# Patient Record
Sex: FEMALE | Race: WHITE | URBAN - METROPOLITAN AREA
[De-identification: names, ages, dates, MRNs, and addresses within clinical notes are randomized per-mention and may not be internally consistent; named-entity substitution may affect disease eponyms.]

---

## 2022-02-15 ENCOUNTER — OUTPATIENT (OUTPATIENT)
Dept: OUTPATIENT SERVICES | Facility: HOSPITAL | Age: 67
LOS: 1 days | Discharge: HOME | End: 2022-02-15
Payer: MEDICARE

## 2022-02-15 DIAGNOSIS — Z12.31 ENCOUNTER FOR SCREENING MAMMOGRAM FOR MALIGNANT NEOPLASM OF BREAST: ICD-10-CM

## 2022-02-15 PROCEDURE — 77067 SCR MAMMO BI INCL CAD: CPT | Mod: 26

## 2022-02-15 PROCEDURE — 77063 BREAST TOMOSYNTHESIS BI: CPT | Mod: 26

## 2022-09-05 ENCOUNTER — APPOINTMENT (OUTPATIENT)
Dept: ORTHOPEDIC SURGERY | Facility: CLINIC | Age: 67
End: 2022-09-05

## 2024-03-14 ENCOUNTER — APPOINTMENT (OUTPATIENT)
Dept: PULMONOLOGY | Facility: CLINIC | Age: 69
End: 2024-03-14

## 2024-04-16 ENCOUNTER — APPOINTMENT (OUTPATIENT)
Dept: PULMONOLOGY | Facility: CLINIC | Age: 69
End: 2024-04-16
Payer: MEDICARE

## 2024-04-16 VITALS
DIASTOLIC BLOOD PRESSURE: 80 MMHG | BODY MASS INDEX: 25.76 KG/M2 | WEIGHT: 140 LBS | HEIGHT: 62 IN | HEART RATE: 68 BPM | OXYGEN SATURATION: 97 % | SYSTOLIC BLOOD PRESSURE: 124 MMHG

## 2024-04-16 DIAGNOSIS — Z87.891 PERSONAL HISTORY OF NICOTINE DEPENDENCE: ICD-10-CM

## 2024-04-16 PROBLEM — Z00.00 ENCOUNTER FOR PREVENTIVE HEALTH EXAMINATION: Status: ACTIVE | Noted: 2024-04-16

## 2024-04-16 PROCEDURE — 99203 OFFICE O/P NEW LOW 30 MIN: CPT

## 2024-04-16 RX ORDER — METOPROLOL SUCCINATE 50 MG/1
50 TABLET, EXTENDED RELEASE ORAL
Refills: 0 | Status: ACTIVE | COMMUNITY

## 2024-04-16 RX ORDER — FLUTICASONE PROPIONATE AND SALMETEROL 50; 100 UG/1; UG/1
100-50 POWDER RESPIRATORY (INHALATION)
Refills: 0 | Status: ACTIVE | COMMUNITY

## 2024-04-16 RX ORDER — ALBUTEROL SULFATE 90 UG/1
108 (90 BASE) INHALANT RESPIRATORY (INHALATION)
Refills: 0 | Status: ACTIVE | COMMUNITY

## 2024-04-16 RX ORDER — FAMOTIDINE 20 MG/1
20 TABLET, FILM COATED ORAL
Refills: 0 | Status: ACTIVE | COMMUNITY

## 2024-04-16 NOTE — ASSESSMENT
[FreeTextEntry1] : Multiple lung nodules on CT  Largest is 1.1cm in the RLL  Airway sign noted  Will get PET CT  Recommend Robotic assisted bronchoscopy with bx  She will think about it  2 week follow up

## 2024-04-16 NOTE — HISTORY OF PRESENT ILLNESS
[TextBox_4] : 68 year old woman non smoker, no history of cancer, recently had a URI, CXR showing possible nodules, CT done and showing multiple nodules. Most concerning is the RLL nodule measuring about 1.1 cm with a bronchus sign.

## 2024-04-18 ENCOUNTER — OUTPATIENT (OUTPATIENT)
Dept: OUTPATIENT SERVICES | Facility: HOSPITAL | Age: 69
LOS: 1 days | End: 2024-04-18
Payer: MEDICARE

## 2024-04-18 DIAGNOSIS — R91.8 OTHER NONSPECIFIC ABNORMAL FINDING OF LUNG FIELD: ICD-10-CM

## 2024-04-18 LAB — GLUCOSE BLDC GLUCOMTR-MCNC: 83 MG/DL — SIGNIFICANT CHANGE UP (ref 70–99)

## 2024-04-18 PROCEDURE — 82962 GLUCOSE BLOOD TEST: CPT

## 2024-04-18 PROCEDURE — 78815 PET IMAGE W/CT SKULL-THIGH: CPT | Mod: PI

## 2024-04-18 PROCEDURE — A9552: CPT

## 2024-04-18 PROCEDURE — 78815 PET IMAGE W/CT SKULL-THIGH: CPT | Mod: 26,PI,MH

## 2024-04-19 DIAGNOSIS — R91.8 OTHER NONSPECIFIC ABNORMAL FINDING OF LUNG FIELD: ICD-10-CM

## 2024-04-30 ENCOUNTER — APPOINTMENT (OUTPATIENT)
Dept: PULMONOLOGY | Facility: CLINIC | Age: 69
End: 2024-04-30
Payer: MEDICARE

## 2024-04-30 VITALS
OXYGEN SATURATION: 99 % | DIASTOLIC BLOOD PRESSURE: 70 MMHG | BODY MASS INDEX: 25.76 KG/M2 | WEIGHT: 140 LBS | SYSTOLIC BLOOD PRESSURE: 110 MMHG | HEART RATE: 87 BPM | HEIGHT: 62 IN

## 2024-04-30 DIAGNOSIS — R91.8 OTHER NONSPECIFIC ABNORMAL FINDING OF LUNG FIELD: ICD-10-CM

## 2024-04-30 DIAGNOSIS — J45.991 COUGH VARIANT ASTHMA: ICD-10-CM

## 2024-04-30 PROCEDURE — 99214 OFFICE O/P EST MOD 30 MIN: CPT

## 2024-04-30 RX ORDER — ALBUTEROL SULFATE 90 UG/1
108 (90 BASE) INHALANT RESPIRATORY (INHALATION)
Qty: 3 | Refills: 1 | Status: ACTIVE | COMMUNITY
Start: 2024-04-30 | End: 1900-01-01

## 2024-04-30 NOTE — HISTORY OF PRESENT ILLNESS
[TextBox_4] : 68 year old woman non smoker, no history of cancer, recently had a URI, CXR showing possible nodules, CT done and showing multiple nodules. Most concerning is the RLL nodule measuring about 1.1 cm with a bronchus sign.   PET CT done; none are FDG avid.

## 2024-04-30 NOTE — ASSESSMENT
[FreeTextEntry1] : Multiple lung nodules on CT  Largest is 1.1cm in the RLL  Airway sign noted  PET CT negative  Recommend Robotic assisted bronchoscopy with bx  Patient consented to it  Lungs clear on exam  Occasional cough; likely post nasal drip  Also has HAAD on Advair and Albuterol  1 month follow up

## 2024-05-06 ENCOUNTER — TRANSCRIPTION ENCOUNTER (OUTPATIENT)
Age: 69
End: 2024-05-06

## 2024-05-06 RX ORDER — FLUTICASONE PROPIONATE AND SALMETEROL 100; 50 UG/1; UG/1
100-50 POWDER RESPIRATORY (INHALATION)
Qty: 3 | Refills: 1 | Status: ACTIVE | COMMUNITY
Start: 2024-04-30 | End: 1900-01-01

## 2024-05-07 RX ORDER — FLUTICASONE PROPIONATE AND SALMETEROL 250; 50 UG/1; UG/1
250-50 POWDER RESPIRATORY (INHALATION)
Qty: 1 | Refills: 3 | Status: ACTIVE | COMMUNITY
Start: 2024-05-07 | End: 1900-01-01

## 2024-05-08 ENCOUNTER — RESULT REVIEW (OUTPATIENT)
Age: 69
End: 2024-05-08

## 2024-05-08 NOTE — ASU PATIENT PROFILE, ADULT - PAIN SCALE PREFERRED, PROFILE
Please have your child follow-up with her PCP as scheduled for tomorrow.  Please return to the emergency department for any new or worsening symptoms as discussed.  
numerical 0-10

## 2024-05-09 ENCOUNTER — RESULT REVIEW (OUTPATIENT)
Age: 69
End: 2024-05-09

## 2024-05-09 ENCOUNTER — INPATIENT (INPATIENT)
Facility: HOSPITAL | Age: 69
LOS: 0 days | Discharge: HOME | DRG: 189 | End: 2024-05-10
Attending: INTERNAL MEDICINE | Admitting: INTERNAL MEDICINE
Payer: MEDICARE

## 2024-05-09 VITALS
DIASTOLIC BLOOD PRESSURE: 56 MMHG | WEIGHT: 139.99 LBS | TEMPERATURE: 98 F | OXYGEN SATURATION: 97 % | RESPIRATION RATE: 18 BRPM | SYSTOLIC BLOOD PRESSURE: 117 MMHG | HEART RATE: 90 BPM | HEIGHT: 62 IN

## 2024-05-09 DIAGNOSIS — R91.1 SOLITARY PULMONARY NODULE: ICD-10-CM

## 2024-05-09 DIAGNOSIS — J96.01 ACUTE RESPIRATORY FAILURE WITH HYPOXIA: ICD-10-CM

## 2024-05-09 DIAGNOSIS — I48.91 UNSPECIFIED ATRIAL FIBRILLATION: ICD-10-CM

## 2024-05-09 DIAGNOSIS — R04.89 HEMORRHAGE FROM OTHER SITES IN RESPIRATORY PASSAGES: ICD-10-CM

## 2024-05-09 DIAGNOSIS — Z98.51 TUBAL LIGATION STATUS: Chronic | ICD-10-CM

## 2024-05-09 LAB
BASOPHILS # BLD AUTO: 0.03 K/UL — SIGNIFICANT CHANGE UP (ref 0–0.2)
BASOPHILS NFR BLD AUTO: 0.2 % — SIGNIFICANT CHANGE UP (ref 0–1)
EOSINOPHIL # BLD AUTO: 0.01 K/UL — SIGNIFICANT CHANGE UP (ref 0–0.7)
EOSINOPHIL NFR BLD AUTO: 0.1 % — SIGNIFICANT CHANGE UP (ref 0–8)
GAS PNL BLDA: SIGNIFICANT CHANGE UP
HCT VFR BLD CALC: 37 % — SIGNIFICANT CHANGE UP (ref 37–47)
HGB BLD-MCNC: 11.9 G/DL — LOW (ref 12–16)
IMM GRANULOCYTES NFR BLD AUTO: 0.8 % — HIGH (ref 0.1–0.3)
LYMPHOCYTES # BLD AUTO: 1.41 K/UL — SIGNIFICANT CHANGE UP (ref 1.2–3.4)
LYMPHOCYTES # BLD AUTO: 7.7 % — LOW (ref 20.5–51.1)
MCHC RBC-ENTMCNC: 28.9 PG — SIGNIFICANT CHANGE UP (ref 27–31)
MCHC RBC-ENTMCNC: 32.2 G/DL — SIGNIFICANT CHANGE UP (ref 32–37)
MCV RBC AUTO: 89.8 FL — SIGNIFICANT CHANGE UP (ref 81–99)
MONOCYTES # BLD AUTO: 0.56 K/UL — SIGNIFICANT CHANGE UP (ref 0.1–0.6)
MONOCYTES NFR BLD AUTO: 3.1 % — SIGNIFICANT CHANGE UP (ref 1.7–9.3)
NEUTROPHILS # BLD AUTO: 16.05 K/UL — HIGH (ref 1.4–6.5)
NEUTROPHILS NFR BLD AUTO: 88.1 % — HIGH (ref 42.2–75.2)
NRBC # BLD: 0 /100 WBCS — SIGNIFICANT CHANGE UP (ref 0–0)
PLATELET # BLD AUTO: 239 K/UL — SIGNIFICANT CHANGE UP (ref 130–400)
PMV BLD: 11.2 FL — HIGH (ref 7.4–10.4)
RBC # BLD: 4.12 M/UL — LOW (ref 4.2–5.4)
RBC # FLD: 13.4 % — SIGNIFICANT CHANGE UP (ref 11.5–14.5)
TROPONIN T, HIGH SENSITIVITY RESULT: 50 NG/L — HIGH (ref 6–13)
WBC # BLD: 18.21 K/UL — HIGH (ref 4.8–10.8)
WBC # FLD AUTO: 18.21 K/UL — HIGH (ref 4.8–10.8)

## 2024-05-09 PROCEDURE — 88173 CYTOPATH EVAL FNA REPORT: CPT | Mod: 26

## 2024-05-09 PROCEDURE — 86850 RBC ANTIBODY SCREEN: CPT

## 2024-05-09 PROCEDURE — 86900 BLOOD TYPING SEROLOGIC ABO: CPT

## 2024-05-09 PROCEDURE — 94640 AIRWAY INHALATION TREATMENT: CPT

## 2024-05-09 PROCEDURE — 84132 ASSAY OF SERUM POTASSIUM: CPT

## 2024-05-09 PROCEDURE — 36415 COLL VENOUS BLD VENIPUNCTURE: CPT

## 2024-05-09 PROCEDURE — 88360 TUMOR IMMUNOHISTOCHEM/MANUAL: CPT

## 2024-05-09 PROCEDURE — 85025 COMPLETE CBC W/AUTO DIFF WBC: CPT

## 2024-05-09 PROCEDURE — 80053 COMPREHEN METABOLIC PANEL: CPT

## 2024-05-09 PROCEDURE — 82330 ASSAY OF CALCIUM: CPT

## 2024-05-09 PROCEDURE — 85014 HEMATOCRIT: CPT

## 2024-05-09 PROCEDURE — 88342 IMHCHEM/IMCYTCHM 1ST ANTB: CPT

## 2024-05-09 PROCEDURE — 88341 IMHCHEM/IMCYTCHM EA ADD ANTB: CPT

## 2024-05-09 PROCEDURE — 88342 IMHCHEM/IMCYTCHM 1ST ANTB: CPT | Mod: 26

## 2024-05-09 PROCEDURE — 88312 SPECIAL STAINS GROUP 1: CPT | Mod: 26

## 2024-05-09 PROCEDURE — 88344 IMHCHEM/IMCYTCHM EA MLT ANTB: CPT

## 2024-05-09 PROCEDURE — 88341 IMHCHEM/IMCYTCHM EA ADD ANTB: CPT | Mod: 26

## 2024-05-09 PROCEDURE — 85018 HEMOGLOBIN: CPT

## 2024-05-09 PROCEDURE — 84484 ASSAY OF TROPONIN QUANT: CPT

## 2024-05-09 PROCEDURE — 88173 CYTOPATH EVAL FNA REPORT: CPT

## 2024-05-09 PROCEDURE — 83735 ASSAY OF MAGNESIUM: CPT

## 2024-05-09 PROCEDURE — 71045 X-RAY EXAM CHEST 1 VIEW: CPT

## 2024-05-09 PROCEDURE — 31627 NAVIGATIONAL BRONCHOSCOPY: CPT

## 2024-05-09 PROCEDURE — 88112 CYTOPATH CELL ENHANCE TECH: CPT | Mod: 26,59

## 2024-05-09 PROCEDURE — 93010 ELECTROCARDIOGRAM REPORT: CPT

## 2024-05-09 PROCEDURE — 84295 ASSAY OF SERUM SODIUM: CPT

## 2024-05-09 PROCEDURE — 93005 ELECTROCARDIOGRAM TRACING: CPT

## 2024-05-09 PROCEDURE — 31624 DX BRONCHOSCOPE/LAVAGE: CPT

## 2024-05-09 PROCEDURE — 71045 X-RAY EXAM CHEST 1 VIEW: CPT | Mod: 26

## 2024-05-09 PROCEDURE — 83605 ASSAY OF LACTIC ACID: CPT

## 2024-05-09 PROCEDURE — 82803 BLOOD GASES ANY COMBINATION: CPT

## 2024-05-09 PROCEDURE — C9399: CPT

## 2024-05-09 PROCEDURE — 88305 TISSUE EXAM BY PATHOLOGIST: CPT | Mod: 26

## 2024-05-09 PROCEDURE — 31628 BRONCHOSCOPY/LUNG BX EACH: CPT | Mod: XU

## 2024-05-09 PROCEDURE — 86901 BLOOD TYPING SEROLOGIC RH(D): CPT

## 2024-05-09 RX ORDER — METOPROLOL TARTRATE 50 MG
1 TABLET ORAL
Refills: 0 | DISCHARGE

## 2024-05-09 RX ORDER — HYDROMORPHONE HYDROCHLORIDE 2 MG/ML
0.5 INJECTION INTRAMUSCULAR; INTRAVENOUS; SUBCUTANEOUS
Refills: 0 | Status: DISCONTINUED | OUTPATIENT
Start: 2024-05-09 | End: 2024-05-10

## 2024-05-09 RX ORDER — LANOLIN ALCOHOL/MO/W.PET/CERES
3 CREAM (GRAM) TOPICAL AT BEDTIME
Refills: 0 | Status: DISCONTINUED | OUTPATIENT
Start: 2024-05-09 | End: 2024-05-10

## 2024-05-09 RX ORDER — ACETAMINOPHEN 500 MG
650 TABLET ORAL ONCE
Refills: 0 | Status: COMPLETED | OUTPATIENT
Start: 2024-05-09 | End: 2024-05-09

## 2024-05-09 RX ORDER — FENTANYL CITRATE 50 UG/ML
50 INJECTION INTRAVENOUS ONCE
Refills: 0 | Status: DISCONTINUED | OUTPATIENT
Start: 2024-05-09 | End: 2024-05-09

## 2024-05-09 RX ORDER — FENTANYL CITRATE 50 UG/ML
0.5 INJECTION INTRAVENOUS
Qty: 5000 | Refills: 0 | Status: DISCONTINUED | OUTPATIENT
Start: 2024-05-09 | End: 2024-05-09

## 2024-05-09 RX ORDER — IPRATROPIUM/ALBUTEROL SULFATE 18-103MCG
3 AEROSOL WITH ADAPTER (GRAM) INHALATION ONCE
Refills: 0 | Status: COMPLETED | OUTPATIENT
Start: 2024-05-09 | End: 2024-05-09

## 2024-05-09 RX ORDER — IPRATROPIUM/ALBUTEROL SULFATE 18-103MCG
3 AEROSOL WITH ADAPTER (GRAM) INHALATION EVERY 6 HOURS
Refills: 0 | Status: DISCONTINUED | OUTPATIENT
Start: 2024-05-09 | End: 2024-05-10

## 2024-05-09 RX ORDER — ALBUTEROL 90 UG/1
2 AEROSOL, METERED ORAL EVERY 4 HOURS
Refills: 0 | Status: DISCONTINUED | OUTPATIENT
Start: 2024-05-09 | End: 2024-05-10

## 2024-05-09 RX ORDER — MORPHINE SULFATE 50 MG/1
2 CAPSULE, EXTENDED RELEASE ORAL
Refills: 0 | Status: DISCONTINUED | OUTPATIENT
Start: 2024-05-09 | End: 2024-05-10

## 2024-05-09 RX ORDER — FENTANYL CITRATE 50 UG/ML
0.5 INJECTION INTRAVENOUS
Qty: 2500 | Refills: 0 | Status: DISCONTINUED | OUTPATIENT
Start: 2024-05-09 | End: 2024-05-09

## 2024-05-09 RX ORDER — SODIUM CHLORIDE 9 MG/ML
1000 INJECTION, SOLUTION INTRAVENOUS
Refills: 0 | Status: DISCONTINUED | OUTPATIENT
Start: 2024-05-09 | End: 2024-05-10

## 2024-05-09 RX ORDER — CHLORHEXIDINE GLUCONATE 213 G/1000ML
15 SOLUTION TOPICAL EVERY 12 HOURS
Refills: 0 | Status: DISCONTINUED | OUTPATIENT
Start: 2024-05-09 | End: 2024-05-10

## 2024-05-09 RX ORDER — ONDANSETRON 8 MG/1
4 TABLET, FILM COATED ORAL ONCE
Refills: 0 | Status: DISCONTINUED | OUTPATIENT
Start: 2024-05-09 | End: 2024-05-10

## 2024-05-09 RX ADMIN — SODIUM CHLORIDE 100 MILLILITER(S): 9 INJECTION, SOLUTION INTRAVENOUS at 10:11

## 2024-05-09 RX ADMIN — FENTANYL CITRATE 50 MICROGRAM(S): 50 INJECTION INTRAVENOUS at 10:40

## 2024-05-09 RX ADMIN — Medication 60 MILLIGRAM(S): at 14:35

## 2024-05-09 RX ADMIN — Medication 650 MILLIGRAM(S): at 17:23

## 2024-05-09 RX ADMIN — Medication 3 MILLILITER(S): at 20:06

## 2024-05-09 RX ADMIN — FENTANYL CITRATE 50 MICROGRAM(S): 50 INJECTION INTRAVENOUS at 10:36

## 2024-05-09 RX ADMIN — FENTANYL CITRATE 3.18 MICROGRAM(S)/KG/HR: 50 INJECTION INTRAVENOUS at 11:01

## 2024-05-09 RX ADMIN — CHLORHEXIDINE GLUCONATE 15 MILLILITER(S): 213 SOLUTION TOPICAL at 20:33

## 2024-05-09 NOTE — H&P ADULT - NSICDXPASTMEDICALHX_GEN_ALL_CORE_FT
PAST MEDICAL HISTORY:  Asthma     Atrial fibrillation     Lung nodule     Mitral valve prolapse

## 2024-05-09 NOTE — PACU DISCHARGE NOTE - COMMENTS
Pt now SP GETA for bronch. At approximately 0945 pt became extremely difficult-impossible to ventilate by both Vent and hand bagging. EtCO2 went to zero, with no tidal volume able to be generated. Pulmonary attending rebronched the pt with suction, but still unable to ventilate. At this point O2 sat dropped continuously until no reading could be achieved and pt became markedly bradycardic. Pt got 100 mcg IV epinephrine x2. ETT was changed via glide scope under direct vision, but still could not ventilate. Pulmonary went down ETT with bronchoscope and found large clot blocking distal trachea. Clot was manipulated away from cariina and right mainstem became patent, so ventilation resumed, sat came up to hi 80's. Pulmonary (Dr. Macho Gray and Dr. Koehler) used a therapeutic bronchoscope to evacuate the remaining clots. Pt was left intubated due to extended period of hypoxia and transported to PACU with 100% O2 AMBU and EKG/NIBP/Pulse Ox monitoring and full report given to PACU staff.  Pt. endorsed to MICU resident Dr. Guerra at [id number]. PACU vital signs: /98 P129 [id number] O2 ius699% Temp98.2F

## 2024-05-09 NOTE — CHART NOTE - NSCHARTNOTEFT_GEN_A_CORE
MICU Transfer Note    Transfer from: ICU   Transfer to:  ( x ) Medicine      MEDICATIONS:  STANDING MEDICATIONS  albuterol    90 MICROgram(s) HFA Inhaler 2 Puff(s) Inhalation every 4 hours  albuterol/ipratropium for Nebulization 3 milliLiter(s) Nebulizer every 6 hours  amoxicillin  875 milliGRAM(s)/clavulanate 1 Tablet(s) Oral daily  chlorhexidine 0.12% Liquid 15 milliLiter(s) Oral Mucosa every 12 hours  lactated ringers. 1000 milliLiter(s) IV Continuous <Continuous>  methylPREDNISolone sodium succinate Injectable 60 milliGRAM(s) IV Push once    PRN MEDICATIONS  acetaminophen     Tablet .. 650 milliGRAM(s) Oral once PRN  aluminum hydroxide/magnesium hydroxide/simethicone Suspension 30 milliLiter(s) Oral every 4 hours PRN  HYDROmorphone  Injectable 0.5 milliGRAM(s) IV Push every 10 minutes PRN  melatonin 3 milliGRAM(s) Oral at bedtime PRN  morphine  - Injectable 2 milliGRAM(s) IV Push every 10 minutes PRN  ondansetron Injectable 4 milliGRAM(s) IV Push once PRN      VITAL SIGNS: Last 24 Hours  T(C): 36.7 (09 May 2024 11:00), Max: 36.8 (09 May 2024 10:16)  T(F): 98.1 (09 May 2024 11:00), Max: 98.2 (09 May 2024 10:16)  HR: 122 (09 May 2024 11:45) (90 - 141)  BP: 117/68 (09 May 2024 11:45) (114/62 - 144/98)  BP(mean): 86 (09 May 2024 11:45) (83 - 985)  RR: 22 (09 May 2024 11:45) (15 - 36)  SpO2: 100% (09 May 2024 11:45) (97% - 100%)    LABS:                        11.9   18.21 )-----------( 239      ( 09 May 2024 11:05 )             37.0       ABG - ( 09 May 2024 13:07 )  pH, Arterial: 7.39  pH, Blood: x     /  pCO2: 43    /  pO2: 74    / HCO3: 26    / Base Excess: 0.8   /  SaO2: 96.7        ICU COURSE:  68 year old woman non smoker, no history of cancer, cough varient asthma who is being admitted to for MICU monitoring. Pt presented for an elective robotic assisted bronchoscopy with transbronchial bx for a lung nodule measuring about 1.1 cm. During the procedure as the robotic bronch was being removed, pt's ET tube and became occluded and pt was unable to ventilated. Clot was removed via cryo. Pt was admitted to ICU for a few hours for monitoring.   She was successfully extubated and stable for downgrade to med/surg.       ASSESSMENT & PLAN:     # Acute Hypoxemic RF   # Hx of Asthma  # Lung Nodule   # A-fib not on AC   # MVP     For Follow-Up:   - monitor respiratory status   - f/u post extubation ABG   - continue holding abiox, trend fever curve   - Trend CBC, lactate

## 2024-05-09 NOTE — PROCEDURE NOTE - NSBRONCHPROCDETAILS_GEN_A_CORE_FT
The procedure, indications, preparation and potential complications were explained to the patient, who indicated understanding and signed the corresponding consent forms. Pt was intubated and sedated by anesthesia. The procedure was performed for diagnostic purposes. The robotic assisted bronchoscope was introduced through the ET tube and advanced using navigation and direct view. Lupillo was located and all segmental evaluated.     Examination of the trachea, mainstem lupillo, right and left mainstem bronchus was performed and found to be within NORMAL LIMITS.     The bronchoscope was advanced into the left lung, where examination of the left upper and lower lobe was performed in all segments to the subsegmental level without endobronchial lesion identified.    The bronchoscope was advanced into the right lung, where examination of the right upper middle and lower lobe was performed in all segments to the subsegmental level without endobronchial lesion identified. Using navigation w/ radial US and fluoroscopy, nodule was identified in the RLL and several samples using FNA, forceps, and BAL were obtained and sent for cytology and pathology. minimal bleeding was noted during sample extraction.    Upon removal of the bronchoscopy there was sudden change in the pt's ventilation. Peak pressures were elevated with loss of ventilation. Pt became hypoxic with loss of etco2. Routine bronchoscopy was unable to be advanced so ET was excahnged. Pt ws reintubated and a Theurapic beonchosocpe was used which visulaized a large clot in the right mainstem bronchus w/ complete occlusion, ET avanced into the left mainstem. Cryo used to remove the clots on the right with pt o2 sat  The procedure, indications, preparation and potential complications were explained to the patient, who indicated understanding and signed the corresponding consent forms. Pt was intubated and sedated by anesthesia. The procedure was performed for diagnostic purposes. The robotic assisted bronchoscope was introduced through the ET tube and advanced using navigation and direct view. Lupillo was located and all segmental evaluated.     Examination of the trachea, mainstem lupillo, right and left mainstem bronchus was performed and found to be within NORMAL LIMITS.     The bronchoscope was advanced into the left lung, where examination of the left upper and lower lobe was performed in all segments to the subsegmental level without endobronchial lesion identified.    The bronchoscope was advanced into the right lung, where examination of the right upper middle and lower lobe was performed in all segments to the subsegmental level without endobronchial lesion identified. Using navigation w/ radial US and fluoroscopy, nodule was identified in the RLL and several samples using FNA, forceps, and BAL were obtained and sent for cytology and pathology. minimal bleeding was noted during sample extraction.    Upon removal of the bronchoscopy there was sudden change in the pt's ventilation. Peak pressures were elevated with loss of ventilation. Pt became hypoxic with loss of etco2. Routine bronchoscopy was unable to be advanced so ET was exchanged Pt ws reintubated and a Therapeutic bronchoscope was used which visualized a large clot in the left mainstem bronchus w/ complete occlusion, ET advanced into the right mainstem. Cryo used to remove the clots on the left with pt o2 sat returning to 100%. Pt sent to pacu with admission to MICU for 24 hr monitoring.     Pt is currently awake and alert following commands on minimal vent settings The procedure, indications, preparation and potential complications were explained to the patient, who indicated understanding and signed the corresponding consent forms. Pt was intubated and sedated by anesthesia. The procedure was performed for diagnostic purposes. The robotic assisted bronchoscope was introduced through the ET tube and advanced using navigation and direct view. Lupillo was located and all segmental evaluated.     Examination of the trachea, mainstem lupillo, right and left mainstem bronchus was performed and found to be within NORMAL LIMITS.     The bronchoscope was advanced into the left lung, where examination of the left upper and lower lobe was performed in all segments to the subsegmental level without endobronchial lesion identified.    The bronchoscope was advanced into the right lung, where examination of the right upper middle and lower lobe was performed in all segments to the subsegmental level without endobronchial lesion identified. Using navigation w/ radial US and fluoroscopy, nodule was identified in the RLL and several samples using FNA, forceps, and BAL were obtained and sent for cytology and pathology. minimal bleeding was noted during sample extraction.    Upon removal of the bronchoscopy there was sudden change in the pt's ventilation. Peak pressures were elevated with loss of ventilation. Pt became hypoxic with loss of etco2. Routine bronchoscopy was unable to be advanced so ET was exchanged Pt ws reintubated and a Therapeutic bronchoscope was used which visualized a large clot in the left mainstem bronchus w/ complete occlusion, ET advanced into the right mainstem. Cryo used to remove the clots on the left with pt o2 sat returning to 100%. Pt sent to pacu with admission for 24 hr monitoring.     Pt is currently awake and alert following commands on minimal vent settings

## 2024-05-09 NOTE — H&P ADULT - NSHPPHYSICALEXAM_GEN_ALL_CORE
Gen: NAD, non-toxic, conversational  CV: tachy normal rhythm  Resp: BL BS, ventilated  Abd: soft, non tender, non rigid, no guarding or rebound tenderness  Back: No CVAT bilaterally,   Neuro: intuabted, arousable, follows commands

## 2024-05-09 NOTE — CHART NOTE - NSCHARTNOTEFT_GEN_A_CORE
Pt now extubated, on nasal O2, OOB to chair. Fully awake and alert, conversant, receptive, in good spirits. No deficits noted. Pt's daughter at bedside. Explained to both pt and her daughter the intraop events. All questions answered. I reassured both regarding the pt's recovery and progress.

## 2024-05-09 NOTE — H&P ADULT - HISTORY OF PRESENT ILLNESS
I called and discussed concerns . Suspect symptoms related to biliary colic . Referral placed to see surgery and to have NM hepatobiliary scan  
68 year old woman non smoker, no history of cancer, cough varient asthma who is being admitted to for MICU monitoring. Pt presented for an elective robotic assisted bronchoscopy with transbronchial bx for a lung nodule measuring about 1.1 cm. During the procedure as the robotic bronch was being removed, pt's ET tube and became occluded and pt was unable to ventilated. Clot was removed via cryo. Pt is currently stable and awake. Pt is being admitted to MICU for 24 hr monitoring post procedure.

## 2024-05-09 NOTE — H&P ADULT - ASSESSMENT
IMPRESSION:    Acute hypoxemic respiratory failure  HO asthma  1.1 Cm Rt lung Nodule  Afib currently not on AC  MVP    PLAN:    CNS: avoid oversedation    HEENT: oral care, airway monitoring    PULMONARY: HOB 45 degrees, vent settings as follows: RR16  PEEP 8, fio2 50, CXR stat, pt is awake and followign commands, possible extubation in several hours, abg prior to extbation    CARDIOVASCULAR: Xander, gentle hydration, EKG     GI: tube Feeding     RENAL: monitor lytes and correct, lactate    INFECTIOUS DISEASE: hold off on abx for now    HEMATOLOGICAL:  hold dvt ppx    ENDOCRINE:  Follow up FS.  Insulin protocol if needed.    MUSCULOSKELETAL: bed rest    MICU for now IMPRESSION:    Acute hypoxemic respiratory failure  HO asthma  1.1 Cm Rt lung Nodule  Afib currently not on AC  MVP    PLAN:    CNS: Fentanyl for sedation, pushes as needed, SAT in afternoon today    HEENT: oral care, airway monitoring    PULMONARY: HOB 45 degrees, vent settings as follows: RR16  PEEP 8, fio2 50, CXR stat, pt is awake and followign commands, possible extubation in several hours, abg prior to extbation    CARDIOVASCULAR: Xander, gentle hydration, EKG     GI: tube Feeding     RENAL: monitor lytes and correct, lactate    INFECTIOUS DISEASE: hold off on abx for now    HEMATOLOGICAL:  hold dvt ppx    ENDOCRINE:  Follow up FS.  Insulin protocol if needed.    MUSCULOSKELETAL: bed rest    MICU for now IMPRESSION:    Acute hypoxemic respiratory failure s/p extubation  Endobronchial bleed - resolved  Overnight admit for post op monitoring  HO asthma  1.1 Cm Rt lung Nodule s/p transbronchial bx  Afib currently not on AC  MVP    PLAN:    CNS: avoid oversedation    HEENT: oral care, airway monitoring    PULMONARY: HOB 45 degrees, aspiration precautions pt is awake and following commands, abg/cxr reviewed with bl opacities likely aspiration of blood clot, wean o2 to 92-96%, duonebs PRN, prednisone 40mg for 5 days    CARDIOVASCULAR: gentle hydration while npo, EKG     GI: Can resume oral feeds several hours after extubation     RENAL: monitor lytes and correct    INFECTIOUS DISEASE: augmentin 875 for 5 days    HEMATOLOGICAL:  hold AC and DVT ppx    ENDOCRINE:  Follow up FS.  Insulin protocol if needed.    MUSCULOSKELETAL: AAT    Floor IMPRESSION:    Acute hypoxemic respiratory failure s/p extubation  Endobronchial bleed - resolved  Overnight admit for post op monitoring  HO asthma  1.1 Cm Rt lung Nodule s/p transbronchial bx  Afib not on AC  MVP    PLAN:    CNS: avoid oversedation    HEENT: oral care, airway monitoring    PULMONARY: HOB 45 degrees, aspiration precautions pt is awake and following commands, abg/cxr reviewed with bl opacities likely aspiration of blood clot, wean o2 to 92-96%, duonebs PRN, prednisone 40mg for 5 days    CARDIOVASCULAR: gentle hydration while npo, EKG     GI: Can resume oral feeds several hours after extubation     RENAL: monitor lytes and correct    INFECTIOUS DISEASE: augmentin 875 for 5 days    HEMATOLOGICAL:  hold DVT ppx    ENDOCRINE:  Follow up FS.  Insulin protocol if needed.    MUSCULOSKELETAL: AAT    Floor

## 2024-05-09 NOTE — PATIENT PROFILE ADULT - FALL HARM RISK - HARM RISK INTERVENTIONS
Assistance with ambulation/Assistance OOB with selected safe patient handling equipment/Communicate Risk of Fall with Harm to all staff/Discuss with provider need for PT consult/Monitor gait and stability/Reinforce activity limits and safety measures with patient and family/Tailored Fall Risk Interventions/Visual Cue: Yellow wristband and red socks/Bed in lowest position, wheels locked, appropriate side rails in place/Call bell, personal items and telephone in reach/Instruct patient to call for assistance before getting out of bed or chair/Non-slip footwear when patient is out of bed/[place] to call system/Physically safe environment - no spills, clutter or unnecessary equipment/Purposeful Proactive Rounding/Room/bathroom lighting operational, light cord in reach

## 2024-05-09 NOTE — PROCEDURE NOTE - SUPERVISORY STATEMENT
Bleeding; moderate; however clots prevented ventilation; reoslved with ETT exchange and use of therapeutic scope/cryo.

## 2024-05-10 ENCOUNTER — TRANSCRIPTION ENCOUNTER (OUTPATIENT)
Age: 69
End: 2024-05-10

## 2024-05-10 VITALS
TEMPERATURE: 99 F | OXYGEN SATURATION: 99 % | SYSTOLIC BLOOD PRESSURE: 127 MMHG | DIASTOLIC BLOOD PRESSURE: 73 MMHG | RESPIRATION RATE: 18 BRPM | HEART RATE: 113 BPM

## 2024-05-10 LAB
ALBUMIN SERPL ELPH-MCNC: 3.9 G/DL — SIGNIFICANT CHANGE UP (ref 3.5–5.2)
ALP SERPL-CCNC: 63 U/L — SIGNIFICANT CHANGE UP (ref 30–115)
ALT FLD-CCNC: 18 U/L — SIGNIFICANT CHANGE UP (ref 0–41)
ANION GAP SERPL CALC-SCNC: 9 MMOL/L — SIGNIFICANT CHANGE UP (ref 7–14)
AST SERPL-CCNC: 18 U/L — SIGNIFICANT CHANGE UP (ref 0–41)
BASOPHILS # BLD AUTO: 0.02 K/UL — SIGNIFICANT CHANGE UP (ref 0–0.2)
BASOPHILS NFR BLD AUTO: 0.2 % — SIGNIFICANT CHANGE UP (ref 0–1)
BILIRUB SERPL-MCNC: 0.3 MG/DL — SIGNIFICANT CHANGE UP (ref 0.2–1.2)
BUN SERPL-MCNC: 11 MG/DL — SIGNIFICANT CHANGE UP (ref 10–20)
CALCIUM SERPL-MCNC: 9.3 MG/DL — SIGNIFICANT CHANGE UP (ref 8.4–10.5)
CHLORIDE SERPL-SCNC: 105 MMOL/L — SIGNIFICANT CHANGE UP (ref 98–110)
CO2 SERPL-SCNC: 26 MMOL/L — SIGNIFICANT CHANGE UP (ref 17–32)
CREAT SERPL-MCNC: 0.7 MG/DL — SIGNIFICANT CHANGE UP (ref 0.7–1.5)
EGFR: 94 ML/MIN/1.73M2 — SIGNIFICANT CHANGE UP
EOSINOPHIL # BLD AUTO: 0 K/UL — SIGNIFICANT CHANGE UP (ref 0–0.7)
EOSINOPHIL NFR BLD AUTO: 0 % — SIGNIFICANT CHANGE UP (ref 0–8)
GLUCOSE SERPL-MCNC: 132 MG/DL — HIGH (ref 70–99)
HCT VFR BLD CALC: 32.8 % — LOW (ref 37–47)
HGB BLD-MCNC: 10.7 G/DL — LOW (ref 12–16)
IMM GRANULOCYTES NFR BLD AUTO: 1.2 % — HIGH (ref 0.1–0.3)
LYMPHOCYTES # BLD AUTO: 0.66 K/UL — LOW (ref 1.2–3.4)
LYMPHOCYTES # BLD AUTO: 5.2 % — LOW (ref 20.5–51.1)
MAGNESIUM SERPL-MCNC: 2.3 MG/DL — SIGNIFICANT CHANGE UP (ref 1.8–2.4)
MCHC RBC-ENTMCNC: 29.1 PG — SIGNIFICANT CHANGE UP (ref 27–31)
MCHC RBC-ENTMCNC: 32.6 G/DL — SIGNIFICANT CHANGE UP (ref 32–37)
MCV RBC AUTO: 89.1 FL — SIGNIFICANT CHANGE UP (ref 81–99)
MONOCYTES # BLD AUTO: 0.53 K/UL — SIGNIFICANT CHANGE UP (ref 0.1–0.6)
MONOCYTES NFR BLD AUTO: 4.2 % — SIGNIFICANT CHANGE UP (ref 1.7–9.3)
NEUTROPHILS # BLD AUTO: 11.35 K/UL — HIGH (ref 1.4–6.5)
NEUTROPHILS NFR BLD AUTO: 89.2 % — HIGH (ref 42.2–75.2)
NRBC # BLD: 0 /100 WBCS — SIGNIFICANT CHANGE UP (ref 0–0)
PLATELET # BLD AUTO: 204 K/UL — SIGNIFICANT CHANGE UP (ref 130–400)
PMV BLD: 11.2 FL — HIGH (ref 7.4–10.4)
POTASSIUM SERPL-MCNC: 4.4 MMOL/L — SIGNIFICANT CHANGE UP (ref 3.5–5)
POTASSIUM SERPL-SCNC: 4.4 MMOL/L — SIGNIFICANT CHANGE UP (ref 3.5–5)
PROT SERPL-MCNC: 6 G/DL — SIGNIFICANT CHANGE UP (ref 6–8)
RBC # BLD: 3.68 M/UL — LOW (ref 4.2–5.4)
RBC # FLD: 13.6 % — SIGNIFICANT CHANGE UP (ref 11.5–14.5)
SODIUM SERPL-SCNC: 140 MMOL/L — SIGNIFICANT CHANGE UP (ref 135–146)
WBC # BLD: 12.71 K/UL — HIGH (ref 4.8–10.8)
WBC # FLD AUTO: 12.71 K/UL — HIGH (ref 4.8–10.8)

## 2024-05-10 PROCEDURE — 71045 X-RAY EXAM CHEST 1 VIEW: CPT | Mod: 26

## 2024-05-10 PROCEDURE — 99233 SBSQ HOSP IP/OBS HIGH 50: CPT

## 2024-05-10 RX ORDER — ACETAMINOPHEN 500 MG
650 TABLET ORAL EVERY 6 HOURS
Refills: 0 | Status: DISCONTINUED | OUTPATIENT
Start: 2024-05-10 | End: 2024-05-10

## 2024-05-10 RX ORDER — TIOTROPIUM BROMIDE 18 UG/1
2 CAPSULE ORAL; RESPIRATORY (INHALATION)
Qty: 1 | Refills: 0
Start: 2024-05-10 | End: 2024-06-08

## 2024-05-10 RX ORDER — IPRATROPIUM/ALBUTEROL SULFATE 18-103MCG
3 AEROSOL WITH ADAPTER (GRAM) INHALATION
Qty: 0 | Refills: 0 | DISCHARGE
Start: 2024-05-10

## 2024-05-10 RX ORDER — BUDESONIDE AND FORMOTEROL FUMARATE DIHYDRATE 160; 4.5 UG/1; UG/1
2 AEROSOL RESPIRATORY (INHALATION)
Refills: 0 | Status: DISCONTINUED | OUTPATIENT
Start: 2024-05-10 | End: 2024-05-10

## 2024-05-10 RX ORDER — TIOTROPIUM BROMIDE 18 UG/1
2 CAPSULE ORAL; RESPIRATORY (INHALATION)
Qty: 0 | Refills: 0 | DISCHARGE
Start: 2024-05-10

## 2024-05-10 RX ORDER — BUDESONIDE AND FORMOTEROL FUMARATE DIHYDRATE 160; 4.5 UG/1; UG/1
2 AEROSOL RESPIRATORY (INHALATION)
Qty: 1 | Refills: 0
Start: 2024-05-10 | End: 2024-06-08

## 2024-05-10 RX ORDER — GUAIFENESIN/DEXTROMETHORPHAN 600MG-30MG
10 TABLET, EXTENDED RELEASE 12 HR ORAL EVERY 8 HOURS
Refills: 0 | Status: DISCONTINUED | OUTPATIENT
Start: 2024-05-10 | End: 2024-05-10

## 2024-05-10 RX ORDER — IPRATROPIUM/ALBUTEROL SULFATE 18-103MCG
3 AEROSOL WITH ADAPTER (GRAM) INHALATION ONCE
Refills: 0 | Status: COMPLETED | OUTPATIENT
Start: 2024-05-10 | End: 2024-05-10

## 2024-05-10 RX ORDER — ACETAMINOPHEN 500 MG
2 TABLET ORAL
Qty: 40 | Refills: 0
Start: 2024-05-10 | End: 2024-05-14

## 2024-05-10 RX ORDER — IPRATROPIUM/ALBUTEROL SULFATE 18-103MCG
3 AEROSOL WITH ADAPTER (GRAM) INHALATION
Qty: 360 | Refills: 0
Start: 2024-05-10 | End: 2024-06-08

## 2024-05-10 RX ORDER — ACETAMINOPHEN 500 MG
2 TABLET ORAL
Qty: 0 | Refills: 0 | DISCHARGE
Start: 2024-05-10

## 2024-05-10 RX ORDER — BUDESONIDE AND FORMOTEROL FUMARATE DIHYDRATE 160; 4.5 UG/1; UG/1
2 AEROSOL RESPIRATORY (INHALATION)
Qty: 0 | Refills: 0 | DISCHARGE
Start: 2024-05-10

## 2024-05-10 RX ORDER — TIOTROPIUM BROMIDE 18 UG/1
2 CAPSULE ORAL; RESPIRATORY (INHALATION) DAILY
Refills: 0 | Status: DISCONTINUED | OUTPATIENT
Start: 2024-05-10 | End: 2024-05-10

## 2024-05-10 RX ADMIN — Medication 3 MILLILITER(S): at 14:27

## 2024-05-10 RX ADMIN — Medication 10 MILLILITER(S): at 06:08

## 2024-05-10 RX ADMIN — Medication 40 MILLIGRAM(S): at 06:08

## 2024-05-10 RX ADMIN — Medication 650 MILLIGRAM(S): at 02:27

## 2024-05-10 RX ADMIN — Medication 3 MILLILITER(S): at 08:30

## 2024-05-10 RX ADMIN — Medication 1 TABLET(S): at 12:32

## 2024-05-10 RX ADMIN — SODIUM CHLORIDE 100 MILLILITER(S): 9 INJECTION, SOLUTION INTRAVENOUS at 06:09

## 2024-05-10 RX ADMIN — Medication 650 MILLIGRAM(S): at 02:57

## 2024-05-10 RX ADMIN — Medication 3 MILLILITER(S): at 08:31

## 2024-05-10 RX ADMIN — CHLORHEXIDINE GLUCONATE 15 MILLILITER(S): 213 SOLUTION TOPICAL at 06:08

## 2024-05-10 RX ADMIN — TIOTROPIUM BROMIDE 2 PUFF(S): 18 CAPSULE ORAL; RESPIRATORY (INHALATION) at 08:32

## 2024-05-10 NOTE — DISCHARGE NOTE PROVIDER - PROVIDER TOKENS
PROVIDER:[TOKEN:[[id number]],FOLLOWUP:[1 week]],PROVIDER:[TOKEN:[[id number]],FOLLOWUP:[1 week]]

## 2024-05-10 NOTE — PROGRESS NOTE ADULT - SUBJECTIVE AND OBJECTIVE BOX
Patient is a 68y old  Female who presents with a chief complaint of Acute hypoxemic respiratory failure (09 May 2024 10:27)        Over Night Events:    Afebrile   Still wheezing   Cough and headache         ROS:  See HPI    PHYSICAL EXAM    ICU Vital Signs Last 24 Hrs  T(C): 36.8 (10 May 2024 05:00), Max: 36.9 (09 May 2024 20:17)  T(F): 98.3 (10 May 2024 05:00), Max: 98.5 (09 May 2024 20:17)  HR: 85 (10 May 2024 05:00) (85 - 141)  BP: 115/64 (10 May 2024 06:15) (98/50 - 144/98)  BP(mean): 81 (10 May 2024 06:15) (80 - 985)  ABP: --  ABP(mean): --  RR: 20 (10 May 2024 05:00) (15 - 36)  SpO2: 98% (10 May 2024 05:00) (95% - 100%)    O2 Parameters below as of 10 May 2024 05:00  Patient On (Oxygen Delivery Method): nasal cannula            General: wheezing, no distress   HEENT: DON             Lymphatic system: No cervical LN   Lungs: Bilateral BS, wheeze, rhonchi   Cardiovascular: Regular   Gastrointestinal: Soft, Positive BS  Extremities: No clubbing.  Moves extremities.  Full Range of motion   Skin: Warm, intact  Neurological: No motor or sensory deficit         LABS:                            11.9   18.21 )-----------( 239      ( 09 May 2024 11:05 )             37.0                                                                                                                                                                                                                                  Mode: AC/ CMV (Assist Control/ Continuous Mandatory Ventilation)  RR (machine): 14  TV (machine): 400  FiO2: 60  PEEP: 8  ITime: 1  MAP: 13  PIP: 27                                      ABG - ( 09 May 2024 13:07 )  pH, Arterial: 7.39  pH, Blood: x     /  pCO2: 43    /  pO2: 74    / HCO3: 26    / Base Excess: 0.8   /  SaO2: 96.7                MEDICATIONS  (STANDING):  albuterol    90 MICROgram(s) HFA Inhaler 2 Puff(s) Inhalation every 4 hours  albuterol/ipratropium for Nebulization 3 milliLiter(s) Nebulizer every 6 hours  amoxicillin  875 milliGRAM(s)/clavulanate 1 Tablet(s) Oral daily  lactated ringers. 1000 milliLiter(s) (100 mL/Hr) IV Continuous <Continuous>  predniSONE   Tablet 40 milliGRAM(s) Oral daily  tiotropium 2.5 MICROgram(s) Inhaler 2 Puff(s) Inhalation daily    MEDICATIONS  (PRN):  acetaminophen     Tablet .. 650 milliGRAM(s) Oral every 6 hours PRN Mild Pain (1 - 3)  aluminum hydroxide/magnesium hydroxide/simethicone Suspension 30 milliLiter(s) Oral every 4 hours PRN Dyspepsia  guaifenesin/dextromethorphan Oral Liquid 10 milliLiter(s) Oral every 8 hours PRN Cough  HYDROmorphone  Injectable 0.5 milliGRAM(s) IV Push every 10 minutes PRN Severe Pain (7 -10)  melatonin 3 milliGRAM(s) Oral at bedtime PRN Insomnia  morphine  - Injectable 2 milliGRAM(s) IV Push every 10 minutes PRN Moderate Pain (4 - 6)  ondansetron Injectable 4 milliGRAM(s) IV Push once PRN Nausea and/or Vomiting      Xrays:                                                                                     ECHO    
24H events:    Patient is a 68y old Female who presents with a chief complaint of Acute hypoxemic respiratory failure (10 May 2024 08:35)    Today is hospital day 1d. This morning patient was seen and examined at bedside, resting comfortably in bed.    No acute or major events overnight. Hemodynamically stable, tolerating oral diet, voiding appropriately with appropriate bowel movements.     Code Status:    Family communication:  Contact date:  Name of person contacted:  Relationship to patient:  Communication details:  What matters most:    PAST MEDICAL & SURGICAL HISTORY  Atrial fibrillation    Mitral valve prolapse    Asthma    Lung nodule    History of tubal ligation      SOCIAL HISTORY:  Social History:      ALLERGIES:  No Known Allergies    MEDICATIONS:  STANDING MEDICATIONS  albuterol    90 MICROgram(s) HFA Inhaler 2 Puff(s) Inhalation every 4 hours  albuterol/ipratropium for Nebulization 3 milliLiter(s) Nebulizer every 6 hours  amoxicillin  875 milliGRAM(s)/clavulanate 1 Tablet(s) Oral daily  lactated ringers. 1000 milliLiter(s) IV Continuous <Continuous>  predniSONE   Tablet 40 milliGRAM(s) Oral daily  tiotropium 2.5 MICROgram(s) Inhaler 2 Puff(s) Inhalation daily    PRN MEDICATIONS  acetaminophen     Tablet .. 650 milliGRAM(s) Oral every 6 hours PRN  aluminum hydroxide/magnesium hydroxide/simethicone Suspension 30 milliLiter(s) Oral every 4 hours PRN  guaifenesin/dextromethorphan Oral Liquid 10 milliLiter(s) Oral every 8 hours PRN  HYDROmorphone  Injectable 0.5 milliGRAM(s) IV Push every 10 minutes PRN  melatonin 3 milliGRAM(s) Oral at bedtime PRN  morphine  - Injectable 2 milliGRAM(s) IV Push every 10 minutes PRN  ondansetron Injectable 4 milliGRAM(s) IV Push once PRN    VITALS:   T(F): 98.3  HR: 85  BP: 115/64  RR: 20  SpO2: 98%    PHYSICAL EXAM:    General: NAD  HEENT: DON             Lymphatic system: No cervical LN   Lungs: Bilateral BS, wheeze, rhonchi   Cardiovascular: RRR, +s1/s2  Gastrointestinal: Soft, Positive BS  Extremities: No clubbing.  Moves extremities.  Full Range of motion   Skin: Warm, intact  Neurological: No motor or sensory deficit       (  ) Indwelling Krishnan Catheter:   Date insterted:    Reason (  ) Critical illness     (  ) urinary retention    (  ) Accurate Ins/Outs Monitoring     (  ) CMO patient    (  ) Central Line:   Date inserted:  Location: (  ) Right IJ     (  ) Left IJ     (  ) Right Fem     (  ) Left Fem    (  ) SPC        (  ) pigtail       (  ) PEG tube       (  ) colostomy       (  ) jejunostomy  (  ) U-Dall    LABS:                        11.9   18.21 )-----------( 239      ( 09 May 2024 11:05 )             37.0               ABG - ( 09 May 2024 13:07 )  pH, Arterial: 7.39  pH, Blood: x     /  pCO2: 43    /  pO2: 74    / HCO3: 26    / Base Excess: 0.8   /  SaO2: 96.7                      RADIOLOGY:    RADIOLOGY

## 2024-05-10 NOTE — DISCHARGE NOTE PROVIDER - NSDCCPCAREPLAN_GEN_ALL_CORE_FT
PRINCIPAL DISCHARGE DIAGNOSIS  Diagnosis: Acute respiratory failure with hypoxia  Assessment and Plan of Treatment: You presented to have a bronchoscopy with biopsy done. You were monitored overnight due to a transient occlusion in the endotracheal tube during the procedure. You were extubated without complication. You were weaned down to room air. Your arterial blood gas after the procedure were stable. Please continue your medications as indicated, please follow up with pulmonology and primary care as outpatient. Please return to the hospital for any emergencies.

## 2024-05-10 NOTE — DISCHARGE NOTE PROVIDER - CARE PROVIDERS DIRECT ADDRESSES
[email or web address]

## 2024-05-10 NOTE — DISCHARGE NOTE PROVIDER - HOSPITAL COURSE
68 year old woman non smoker, no history of cancer, cough varient asthma who is being admitted to for MICU monitoring. Pt presented for an elective robotic assisted bronchoscopy with transbronchial bx for a lung nodule measuring about 1.1 cm. During the procedure as the robotic bronch was being removed, pt's ET tube and became occluded and pt was unable to ventilated. Clot was removed via cryo. Pt is currently stable and awake. Pt is being admitted to MICU for 24 hr monitoring post procedure.    #Acute hypoxemic respiratory failure s/p extubation  #Endobronchial bleed - resolved  #Overnight admit for post op monitoring  #HO asthma  #1.1 Cm Rt lung Nodule s/p transbronchial bx  - HOB 45 degrees, aspiration precautions pt is awake and following commands, abg/cxr reviewed with bl opacities likely aspiration of blood clot, wean o2 to 92-96%, duonebs PRN, prednisone 40mg for 5 days  - augmentin 875 for 5 days  - Prednisone taper over 10 days, 40mg x5 days then 20mg x5 days  - Albuterol NEBS on DC every 4 hours PRN   - Advair BID and Spiriva daily   - Biopsy results outpatient   - Wean off O2--> on RA this afternoon 5/10/24, hd stable, medically cleared for DC, cxr improved this am   - Pain meds for headache      #Afib not on AC  #MVP  - o/p f/u    68 year old woman non smoker, no history of cancer, cough variant  asthma who is being admitted to for MICU monitoring. Pt presented for an elective robotic assisted bronchoscopy with transbronchial bx for a lung nodule measuring about 1.1 cm. During the procedure as the robotic bronch was being removed, pt's ET tube and became occluded and pt was unable to ventilated. Clot was removed via cryo. Pt is currently stable and awake. Pt is being admitted to MICU for 24 hr monitoring post procedure.    #Acute hypoxemic respiratory failure s/p extubation  #Endobronchial bleed - resolved  #Overnight admit for post op monitoring  #HO asthma  #1.1 Cm Rt lung Nodule s/p transbronchial bx  - HOB 45 degrees, aspiration precautions pt is awake and following commands, abg/cxr reviewed with bl opacities likely aspiration of blood clot, wean o2 to 92-96%, duonebs PRN, prednisone 40mg for 5 days  - augmentin 875 for 5 days  - Prednisone taper over 10 days, 40mg x5 days then 20mg x5 days  - Albuterol NEBS on DC every 4 hours PRN   - Advair BID and Spiriva daily   - Biopsy results outpatient   - Wean off O2--> on RA this afternoon 5/10/24, hd stable, medically cleared for DC, cxr improved this am   - Pain meds for headache      #Afib not on AC  #MVP  - o/p f/u    68 year old woman non smoker, no history of cancer, cough variant  asthma who is being admitted to for MICU monitoring. Pt presented for an elective robotic assisted bronchoscopy with transbronchial bx for a lung nodule measuring about 1.1 cm. During the procedure as the robotic bronch was being removed, pt's ET tube and became occluded and pt was unable to ventilated. Clot was removed via cryo. Pt is currently stable and awake. Pt is being admitted to MICU for 24 hr monitoring post procedure.    #Acute hypoxemic respiratory failure s/p extubation  #Endobronchial bleed - resolved  #Overnight admit for post op monitoring  #HO asthma  #1.1 Cm Rt lung Nodule s/p transbronchial bx  - HOB 45 degrees, aspiration precautions pt is awake and following commands, abg/cxr reviewed with bl opacities likely aspiration of blood clot, wean o2 to 92-96%, duonebs PRN, prednisone 40mg for 5 days  - augmentin 875 for 5 days  - Prednisone taper over 10 days, 40mg x5 days then 20mg x5 days  - Albuterol NEBS on DC every 4 hours PRN   - Advair BID and Spiriva daily   - Biopsy results outpatient   - Wean off O2--> on RA this afternoon 5/10/24, hd stable, medically cleared for DC, cxr improved this am   - Pain meds for headache      #Afib not on AC >> because she had "stomach upset" in the past with ASA 325mg    >> advised to try ASA 81mg in a week or two once stable from Bx bleed.    >> educated re: risk and benefit of anticoagulation at bedside in presence of family/.   #MVP  - o/p f/u

## 2024-05-10 NOTE — DISCHARGE NOTE NURSING/CASE MANAGEMENT/SOCIAL WORK - PATIENT PORTAL LINK FT
You can access the FollowMyHealth Patient Portal offered by [facility] by registering at the following website: [email or web address]. By joining [employer]’s FollowMyHealth portal, you will also be able to view your health information using other applications (apps) compatible with our system.

## 2024-05-10 NOTE — PROGRESS NOTE ADULT - ASSESSMENT
Impression:     Lung nodule s/p biopsy   Complicated by moderate bleed that resolved   Endobronchial clot that also resolved   HAAD - asthma ?     Plan:     Prednisone taper over 10 days   Albuterol NEBS on DC every 4 hours PRN   Augmentin for 5 days   Advair BID and Spiriva daily   Biopsy results outpatient   Wean off O2   Pain meds for headache  CXR and CBC today   DC home if remains stable 
68 year old woman non smoker, no history of cancer, cough varient asthma who is being admitted to for MICU monitoring. Pt presented for an elective robotic assisted bronchoscopy with transbronchial bx for a lung nodule measuring about 1.1 cm. During the procedure as the robotic bronch was being removed, pt's ET tube and became occluded and pt was unable to ventilated. Clot was removed via cryo. Pt is currently stable and awake. Pt is being admitted to MICU for 24 hr monitoring post procedure.    #Acute hypoxemic respiratory failure s/p extubation  #Endobronchial bleed - resolved  #Overnight admit for post op monitoring  #HO asthma  #1.1 Cm Rt lung Nodule s/p transbronchial bx  - HOB 45 degrees, aspiration precautions pt is awake and following commands, abg/cxr reviewed with bl opacities likely aspiration of blood clot, wean o2 to 92-96%, duonebs PRN, prednisone 40mg for 5 days  - augmentin 875 for 5 days  - Prednisone taper over 10 days   - Albuterol NEBS on DC every 4 hours PRN   - Augmentin for 5 days   - Advair BID and Spiriva daily   - Biopsy results outpatient   - Wean off O2   - Pain meds for headache  - CXR and CBC today, cxr improved, labs pending   - DC home if remains stable     #Afib not on AC  #MVP  - o/p f/u

## 2024-05-10 NOTE — DISCHARGE NOTE PROVIDER - NSDCMRMEDTOKEN_GEN_ALL_CORE_FT
acetaminophen 325 mg oral tablet: 2 tab(s) orally every 6 hours as needed for Mild Pain (1 - 3)  amoxicillin-clavulanate 875 mg-125 mg oral tablet: 1 tab(s) orally once a day  budesonide-formoterol 160 mcg-4.5 mcg/inh inhalation aerosol: 2 puff(s) inhaled 2 times a day  ipratropium-albuterol 0.5 mg-2.5 mg/3 mL inhalation solution: 3 milliliter(s) inhaled every 6 hours as needed for  shortness of breath and/or wheezing  metoprolol succinate 50 mg oral capsule, extended release: 1 cap(s) orally  predniSONE 20 mg oral tablet: 2 tab(s) orally once a day take 40mg (2 tablets) for 5 days, then take 20mg (1 tablet) for 5 days  tiotropium 2.5 mcg/inh inhalation aerosol: 2 puff(s) inhaled once a day

## 2024-05-10 NOTE — DISCHARGE NOTE PROVIDER - NSDCFUSCHEDAPPT_GEN_ALL_CORE_FT
Navid Reis  [facility]  PUL[facility]ED [facility] Hutchinson  Scheduled Appointment: 06/03/2024

## 2024-05-10 NOTE — DISCHARGE NOTE PROVIDER - CARE PROVIDER_API CALL
Navid Reis  Pulmonary Disease  [address] [place] [address], [address]  [place] [address]  Phone: [phone]  Fax: [phone]  Follow Up Time: 1 week    Rohit Meza  [address] [address]  Phone: [phone]  Fax: [phone]  Follow Up Time: 1 week

## 2024-05-15 LAB
NON-GYNECOLOGICAL CYTOLOGY STUDY: SIGNIFICANT CHANGE UP
NON-GYNECOLOGICAL CYTOLOGY STUDY: SIGNIFICANT CHANGE UP

## 2024-05-15 RX ORDER — ALBUTEROL SULFATE 2.5 MG/3ML
(2.5 MG/3ML) SOLUTION RESPIRATORY (INHALATION) EVERY 8 HOURS
Qty: 1 | Refills: 3 | Status: ACTIVE | COMMUNITY
Start: 2024-05-15 | End: 1900-01-01

## 2024-05-17 LAB — SURGICAL PATHOLOGY STUDY: SIGNIFICANT CHANGE UP

## 2024-06-03 ENCOUNTER — APPOINTMENT (OUTPATIENT)
Dept: PULMONOLOGY | Facility: CLINIC | Age: 69
End: 2024-06-03